# Patient Record
Sex: MALE | Race: WHITE | NOT HISPANIC OR LATINO | Employment: OTHER | URBAN - METROPOLITAN AREA
[De-identification: names, ages, dates, MRNs, and addresses within clinical notes are randomized per-mention and may not be internally consistent; named-entity substitution may affect disease eponyms.]

---

## 2023-04-01 DIAGNOSIS — E78.2 MIXED HYPERLIPIDEMIA: ICD-10-CM

## 2023-04-03 PROBLEM — R35.0 INCREASED URINARY FREQUENCY: Status: ACTIVE | Noted: 2023-04-03

## 2023-04-03 PROBLEM — F41.1 GENERALIZED ANXIETY DISORDER: Status: ACTIVE | Noted: 2023-04-03

## 2023-04-03 PROBLEM — N40.0 BPH (BENIGN PROSTATIC HYPERPLASIA): Status: ACTIVE | Noted: 2023-04-03

## 2023-04-03 PROBLEM — H61.21 IMPACTED CERUMEN OF RIGHT EAR: Status: ACTIVE | Noted: 2023-04-03

## 2023-04-03 PROBLEM — R35.1 NOCTURIA: Status: ACTIVE | Noted: 2023-04-03

## 2023-04-03 PROBLEM — E78.2 HYPERCHOLESTEROLEMIA WITH HYPERTRIGLYCERIDEMIA: Status: ACTIVE | Noted: 2023-04-03

## 2023-04-03 PROBLEM — H26.9 CATARACT, ACQUIRED: Status: ACTIVE | Noted: 2023-04-03

## 2023-04-03 PROBLEM — R97.20 ELEVATED PSA: Status: ACTIVE | Noted: 2023-04-03

## 2023-04-03 RX ORDER — BUSPIRONE HYDROCHLORIDE 10 MG/1
10 TABLET ORAL 2 TIMES DAILY
COMMUNITY

## 2023-04-03 RX ORDER — CITALOPRAM 40 MG/1
40 TABLET, FILM COATED ORAL DAILY
COMMUNITY

## 2023-04-03 RX ORDER — HYDROXYZINE HYDROCHLORIDE 25 MG/1
TABLET, FILM COATED ORAL
COMMUNITY

## 2023-04-03 RX ORDER — OMEGA-3-ACID ETHYL ESTERS 1 G/1
2 CAPSULE, LIQUID FILLED ORAL DAILY
COMMUNITY
Start: 2020-11-18 | End: 2023-11-07 | Stop reason: WASHOUT

## 2023-04-03 RX ORDER — OXYBUTYNIN CHLORIDE 5 MG/1
5 TABLET ORAL 2 TIMES DAILY
COMMUNITY
End: 2023-10-24

## 2023-04-03 RX ORDER — ONDANSETRON HYDROCHLORIDE 8 MG/1
8 TABLET, FILM COATED ORAL EVERY 8 HOURS PRN
COMMUNITY
Start: 2022-11-22 | End: 2023-11-07 | Stop reason: ALTCHOICE

## 2023-04-03 RX ORDER — PREDNISOLONE SODIUM PHOSPHATE 10 MG/ML
SOLUTION/ DROPS OPHTHALMIC
COMMUNITY
Start: 2022-12-27 | End: 2023-11-07 | Stop reason: ALTCHOICE

## 2023-04-03 RX ORDER — ATORVASTATIN CALCIUM 20 MG/1
TABLET, FILM COATED ORAL
Qty: 90 TABLET | Refills: 3 | Status: SHIPPED | OUTPATIENT
Start: 2023-04-03 | End: 2024-04-18

## 2023-04-03 RX ORDER — ATORVASTATIN CALCIUM 20 MG/1
20 TABLET, FILM COATED ORAL DAILY
COMMUNITY
End: 2023-04-03 | Stop reason: SDUPTHER

## 2023-04-18 ENCOUNTER — TELEPHONE (OUTPATIENT)
Dept: PRIMARY CARE | Facility: CLINIC | Age: 63
End: 2023-04-18
Payer: COMMERCIAL

## 2023-04-18 NOTE — TELEPHONE ENCOUNTER
Our electronic records do not go back that far but my guess is 2007 or 2008 based on when he was diagnosed with cancer. His cancer doctor may know the exact date. Can you let him know?   Thanks,  Denise Myers, DO

## 2023-04-18 NOTE — TELEPHONE ENCOUNTER
Pt wants to know when the last round of radiation he took was? I tried to look, and saw he was diagnosed in 2007, but I can't seem to find a date for his late radiation.

## 2023-05-12 ENCOUNTER — TELEPHONE (OUTPATIENT)
Dept: PRIMARY CARE | Facility: CLINIC | Age: 63
End: 2023-05-12
Payer: COMMERCIAL

## 2023-05-12 NOTE — TELEPHONE ENCOUNTER
Patient called asking if there is any way that Dr Myers can give him an excuse from jury duty due to his frequent urination issue.

## 2023-05-13 NOTE — TELEPHONE ENCOUNTER
Chart reviewed. Per last visit with urologist (Dr. Brody), his symptoms are controlled on the medication he is being prescribed. It would be dishonest/unethical to write a letter to excuse him from jury duty for this problem.   Thanks,  Denise Myers, DO

## 2023-05-15 NOTE — TELEPHONE ENCOUNTER
Pt called back and notified of providers message, expressed verbal understanding no questions at this time.

## 2023-05-16 ENCOUNTER — TELEPHONE (OUTPATIENT)
Dept: PRIMARY CARE | Facility: CLINIC | Age: 63
End: 2023-05-16
Payer: COMMERCIAL

## 2023-05-16 DIAGNOSIS — I10 BENIGN ESSENTIAL HYPERTENSION: Primary | ICD-10-CM

## 2023-05-16 RX ORDER — LOSARTAN POTASSIUM 50 MG/1
TABLET ORAL
Qty: 30 TABLET | Refills: 2 | Status: SHIPPED | OUTPATIENT
Start: 2023-05-16 | End: 2023-06-06 | Stop reason: SDUPTHER

## 2023-05-16 NOTE — TELEPHONE ENCOUNTER
I sent in a BP medication losartan for him to start taking daily. It can take up to 2 weeks to take full effect. Please schedule for MA visit for 3 weeks from today for BP check.   Thanks,  Denise Myers, DO

## 2023-05-16 NOTE — TELEPHONE ENCOUNTER
Called patient, notified of message, patient expressed verbal understanding had no questions at this time. Scheduled a visit on 6/6/23.

## 2023-05-16 NOTE — TELEPHONE ENCOUNTER
Patient is complaining of extremly high blood presuree. Patient says is systolic had been as high as 201.  He is asking for an office visit, but I am showing nothing open soon.  He is on meds to lower cholesterol, but nothing for BP.  It has been running high since April.

## 2023-05-24 PROBLEM — K21.9 GERD (GASTROESOPHAGEAL REFLUX DISEASE): Status: ACTIVE | Noted: 2023-05-24

## 2023-05-24 PROBLEM — K63.5 COLON POLYPS: Status: ACTIVE | Noted: 2023-05-24

## 2023-05-24 PROBLEM — N31.8 FREQUENCY-URGENCY SYNDROME: Status: ACTIVE | Noted: 2023-05-24

## 2023-05-24 PROBLEM — R11.0 NAUSEA IN ADULT: Status: ACTIVE | Noted: 2023-05-24

## 2023-05-24 RX ORDER — KETOROLAC TROMETHAMINE 5 MG/ML
2 SOLUTION OPHTHALMIC 4 TIMES DAILY
COMMUNITY
Start: 2023-04-12 | End: 2023-11-07 | Stop reason: ALTCHOICE

## 2023-06-01 ENCOUNTER — TELEPHONE (OUTPATIENT)
Dept: PRIMARY CARE | Facility: CLINIC | Age: 63
End: 2023-06-01
Payer: COMMERCIAL

## 2023-06-06 ENCOUNTER — CLINICAL SUPPORT (OUTPATIENT)
Dept: PRIMARY CARE | Facility: CLINIC | Age: 63
End: 2023-06-06
Payer: COMMERCIAL

## 2023-06-06 VITALS
OXYGEN SATURATION: 97 % | SYSTOLIC BLOOD PRESSURE: 162 MMHG | RESPIRATION RATE: 16 BRPM | HEART RATE: 66 BPM | DIASTOLIC BLOOD PRESSURE: 90 MMHG

## 2023-06-06 DIAGNOSIS — I10 BENIGN ESSENTIAL HYPERTENSION: ICD-10-CM

## 2023-06-06 RX ORDER — LOSARTAN POTASSIUM 100 MG/1
100 TABLET ORAL DAILY
Qty: 30 TABLET | Refills: 2 | Status: SHIPPED | OUTPATIENT
Start: 2023-06-06 | End: 2023-08-31

## 2023-06-06 NOTE — Clinical Note
I would like to increase the losartan to 100 mg. New rx sent to pharmacy. Can you let him know? I recommend MA visit in 2 weeks to re-check BP.  Thanks, Denise Myers, DO

## 2023-06-06 NOTE — PROGRESS NOTES
Spoke to patient he will call back to schedule a nurse visit for BP check for 2 weeks and start the 100mg

## 2023-06-20 ENCOUNTER — CLINICAL SUPPORT (OUTPATIENT)
Dept: PRIMARY CARE | Facility: CLINIC | Age: 63
End: 2023-06-20
Payer: COMMERCIAL

## 2023-06-20 VITALS — DIASTOLIC BLOOD PRESSURE: 75 MMHG | HEART RATE: 60 BPM | SYSTOLIC BLOOD PRESSURE: 150 MMHG | OXYGEN SATURATION: 95 %

## 2023-06-20 DIAGNOSIS — I10 BENIGN ESSENTIAL HYPERTENSION: Primary | ICD-10-CM

## 2023-06-20 PROCEDURE — 99211 OFF/OP EST MAY X REQ PHY/QHP: CPT | Performed by: FAMILY MEDICINE

## 2023-06-20 NOTE — Clinical Note
His blood pressure is still higher than I would like. Would he be ok with me adding another BP medication since he is on the maximum dose of the losartan? Thanks, Denise Myers, DO

## 2023-08-27 DIAGNOSIS — I10 BENIGN ESSENTIAL HYPERTENSION: ICD-10-CM

## 2023-08-31 RX ORDER — LOSARTAN POTASSIUM 100 MG/1
100 TABLET ORAL DAILY
Qty: 90 TABLET | Refills: 0 | Status: SHIPPED | OUTPATIENT
Start: 2023-08-31 | End: 2023-11-27

## 2023-08-31 RX ORDER — HYDROXYZINE PAMOATE 25 MG/1
25 CAPSULE ORAL EVERY 24 HOURS
COMMUNITY
End: 2023-11-07 | Stop reason: ALTCHOICE

## 2023-10-24 DIAGNOSIS — N40.0 BENIGN PROSTATIC HYPERPLASIA WITHOUT LOWER URINARY TRACT SYMPTOMS: ICD-10-CM

## 2023-10-24 RX ORDER — OXYBUTYNIN CHLORIDE 5 MG/1
5 TABLET ORAL 2 TIMES DAILY
Qty: 180 TABLET | Refills: 3 | Status: SHIPPED | OUTPATIENT
Start: 2023-10-24

## 2023-11-07 ENCOUNTER — OFFICE VISIT (OUTPATIENT)
Dept: PRIMARY CARE | Facility: CLINIC | Age: 63
End: 2023-11-07
Payer: COMMERCIAL

## 2023-11-07 VITALS
OXYGEN SATURATION: 96 % | WEIGHT: 158 LBS | HEART RATE: 70 BPM | HEIGHT: 66 IN | BODY MASS INDEX: 25.39 KG/M2 | SYSTOLIC BLOOD PRESSURE: 128 MMHG | TEMPERATURE: 97.5 F | DIASTOLIC BLOOD PRESSURE: 82 MMHG | RESPIRATION RATE: 16 BRPM

## 2023-11-07 DIAGNOSIS — R97.20 ELEVATED PSA: ICD-10-CM

## 2023-11-07 DIAGNOSIS — H61.21 IMPACTED CERUMEN OF RIGHT EAR: ICD-10-CM

## 2023-11-07 DIAGNOSIS — I10 BENIGN ESSENTIAL HYPERTENSION: ICD-10-CM

## 2023-11-07 DIAGNOSIS — R73.03 PREDIABETES: ICD-10-CM

## 2023-11-07 DIAGNOSIS — Z00.00 ANNUAL PHYSICAL EXAM: Primary | ICD-10-CM

## 2023-11-07 DIAGNOSIS — Z11.59 NEED FOR HEPATITIS C SCREENING TEST: ICD-10-CM

## 2023-11-07 DIAGNOSIS — E78.2 HYPERCHOLESTEROLEMIA WITH HYPERTRIGLYCERIDEMIA: ICD-10-CM

## 2023-11-07 PROBLEM — R35.1 NOCTURIA: Status: RESOLVED | Noted: 2023-04-03 | Resolved: 2023-11-07

## 2023-11-07 PROBLEM — R35.1 BENIGN PROSTATIC HYPERPLASIA WITH NOCTURIA: Status: ACTIVE | Noted: 2023-04-03

## 2023-11-07 PROBLEM — N40.1 BENIGN PROSTATIC HYPERPLASIA WITH NOCTURIA: Status: ACTIVE | Noted: 2023-04-03

## 2023-11-07 PROBLEM — R35.0 INCREASED URINARY FREQUENCY: Status: RESOLVED | Noted: 2023-04-03 | Resolved: 2023-11-07

## 2023-11-07 PROCEDURE — 3074F SYST BP LT 130 MM HG: CPT | Performed by: FAMILY MEDICINE

## 2023-11-07 PROCEDURE — 1036F TOBACCO NON-USER: CPT | Performed by: FAMILY MEDICINE

## 2023-11-07 PROCEDURE — 99396 PREV VISIT EST AGE 40-64: CPT | Performed by: FAMILY MEDICINE

## 2023-11-07 PROCEDURE — 3079F DIAST BP 80-89 MM HG: CPT | Performed by: FAMILY MEDICINE

## 2023-11-07 PROCEDURE — 69210 REMOVE IMPACTED EAR WAX UNI: CPT | Performed by: FAMILY MEDICINE

## 2023-11-07 SDOH — ECONOMIC STABILITY: FOOD INSECURITY: WITHIN THE PAST 12 MONTHS, THE FOOD YOU BOUGHT JUST DIDN'T LAST AND YOU DIDN'T HAVE MONEY TO GET MORE.: NEVER TRUE

## 2023-11-07 SDOH — ECONOMIC STABILITY: FOOD INSECURITY: WITHIN THE PAST 12 MONTHS, YOU WORRIED THAT YOUR FOOD WOULD RUN OUT BEFORE YOU GOT MONEY TO BUY MORE.: NEVER TRUE

## 2023-11-07 ASSESSMENT — LIFESTYLE VARIABLES
HOW OFTEN DO YOU HAVE SIX OR MORE DRINKS ON ONE OCCASION: NEVER
SKIP TO QUESTIONS 9-10: 1
HOW OFTEN DO YOU HAVE A DRINK CONTAINING ALCOHOL: NEVER
HOW MANY STANDARD DRINKS CONTAINING ALCOHOL DO YOU HAVE ON A TYPICAL DAY: PATIENT DOES NOT DRINK
AUDIT-C TOTAL SCORE: 0

## 2023-11-07 ASSESSMENT — PATIENT HEALTH QUESTIONNAIRE - PHQ9
1. LITTLE INTEREST OR PLEASURE IN DOING THINGS: NOT AT ALL
SUM OF ALL RESPONSES TO PHQ9 QUESTIONS 1 & 2: 0
2. FEELING DOWN, DEPRESSED OR HOPELESS: NOT AT ALL

## 2023-11-07 ASSESSMENT — PAIN SCALES - GENERAL: PAINLEVEL: 0-NO PAIN

## 2023-11-07 NOTE — PROGRESS NOTES
"Subjective   Patient ID: Demond Gregorio is a 62 y.o. male who presents for Annual Exam (Annual physical.) and Eye Problem (\"Floaters\" and blurriness X 1 month.).  Plans on seeing ophtho for new floaters in left eye. He had cataract surgery about 1 year ago.        In addition to that documented in the HPI above, the additional ROS was obtained:  Constitutional: Denies fevers or chills  Eyes: Denies vision changes  ENMT: Denies trouble swallowing  Cardiovascular: Denies chest pain or heart racing  Respiratory: Denies SOB or cough      Current Outpatient Medications   Medication Sig Dispense Refill    atorvastatin (Lipitor) 20 mg tablet TAKE 1 TABLET BY MOUTH EVERY DAY 90 tablet 3    busPIRone (Buspar) 10 mg tablet Take 1 tablet (10 mg) by mouth 2 times a day.      citalopram (CeleXA) 40 mg tablet Take 1 tablet (40 mg) by mouth once daily.      hydrOXYzine HCL (Atarax) 25 mg tablet TAKE 1-2 TABLET BY MOUTH TWICE A DAY AS NEEDED FOR ANXIETY/SLEEP      losartan (Cozaar) 100 mg tablet Take 1 tablet (100 mg) by mouth once daily. 90 tablet 0    oxybutynin (Ditropan) 5 mg tablet TAKE 1 TABLET BY MOUTH TWICE A  tablet 3     No current facility-administered medications for this visit.       Objective     Visit Vitals  /82 (BP Location: Right arm, Patient Position: Sitting, BP Cuff Size: Adult)   Pulse 70   Temp 36.4 °C (97.5 °F)   Resp 16   Ht 1.664 m (5' 5.5\")   Wt 71.7 kg (158 lb)   SpO2 96%   BMI 25.89 kg/m²   Smoking Status Former   BSA 1.82 m²        Constitutional: Well nourished, well developed, appears stated age  Eyes: no scleral icterus, no conjunctival injection  Ears: significant cerumen in bilateral auditory canals   Neck: no thyromegaly  Cardiovascular: regular rate and rhythm, no leg edema  Respiratory: normal respiratory effort, clear to auscultation bilaterally  Musculoskeletal: No gross deformities appreciated  Skin: Warm, dry. No rashes  Neurologic: Alert, CNs II-XII grossly intact..  Psych: " Appropriate mood and affect.      Ear Cerumen Removal    Date/Time: 11/7/2023 2:28 PM    Performed by: Denise Myers DO  Authorized by: Denise Myers DO    Consent:     Consent obtained:  Verbal    Consent given by:  Patient    Risks, benefits, and alternatives were discussed: yes      Alternatives discussed:  No treatment  Procedure details:     Location:  R ear    Procedure type: curette      Procedure outcomes: cerumen removed    Post-procedure details:     Inspection:  No bleeding    Hearing quality:  Improved    Procedure completion:  Tolerated well, no immediate complications      Assessment/Plan   Problem List Items Addressed This Visit       Elevated PSA    Relevant Orders    PSA, total and free    Hypercholesterolemia with hypertriglyceridemia    Relevant Orders    Lipid Panel    Benign essential hypertension    Relevant Orders    Comprehensive Metabolic Panel    Prediabetes    Relevant Orders    Hemoglobin A1C     Other Visit Diagnoses       Annual physical exam    -  Primary    immunizations up to date  Yearly labs ordered    Need for hepatitis C screening test        Relevant Orders    Hepatitis C Antibody    Impacted cerumen of right ear                Follow up yearly and prn.    Denise Myers DO

## 2023-11-07 NOTE — PATIENT INSTRUCTIONS
Please fast for 8 hours for the blood work. Water and black coffee is fine. You do not need an appointment to have your labs done.     Thank you for choosing [facility] for your healthcare.   As always if you have any questions or concerns please do not hesitate to call our office at [phone] or through [employer].    Have a great day!  Denise Myers, DO

## 2023-11-30 ENCOUNTER — OFFICE VISIT (OUTPATIENT)
Dept: PRIMARY CARE | Facility: CLINIC | Age: 63
End: 2023-11-30
Payer: COMMERCIAL

## 2023-11-30 VITALS
WEIGHT: 164 LBS | BODY MASS INDEX: 26.88 KG/M2 | DIASTOLIC BLOOD PRESSURE: 64 MMHG | HEART RATE: 66 BPM | SYSTOLIC BLOOD PRESSURE: 102 MMHG | OXYGEN SATURATION: 98 % | TEMPERATURE: 96.8 F

## 2023-11-30 DIAGNOSIS — D17.23 LIPOMA OF RIGHT LOWER EXTREMITY: Primary | ICD-10-CM

## 2023-11-30 PROCEDURE — 1036F TOBACCO NON-USER: CPT

## 2023-11-30 PROCEDURE — 99212 OFFICE O/P EST SF 10 MIN: CPT

## 2023-11-30 PROCEDURE — 3078F DIAST BP <80 MM HG: CPT

## 2023-11-30 PROCEDURE — 3074F SYST BP LT 130 MM HG: CPT

## 2023-11-30 ASSESSMENT — ENCOUNTER SYMPTOMS
RESPIRATORY NEGATIVE: 1
HEMATOLOGIC/LYMPHATIC NEGATIVE: 1
ENDOCRINE NEGATIVE: 1
CARDIOVASCULAR NEGATIVE: 1
EYES NEGATIVE: 1
PSYCHIATRIC NEGATIVE: 1
NEUROLOGICAL NEGATIVE: 1
MUSCULOSKELETAL NEGATIVE: 1
GASTROINTESTINAL NEGATIVE: 1
CONSTITUTIONAL NEGATIVE: 1

## 2023-11-30 NOTE — PROGRESS NOTES
Subjective   Patient ID: Demond Gregorio is a 62 y.o. male who presents for evaluation of knot/lump.    Noticed a lump/knot 2 months ago while showering, unsure of how long it's been there.    Review of Systems   Constitutional: Negative.    HENT: Negative.     Eyes: Negative.    Respiratory: Negative.     Cardiovascular: Negative.    Gastrointestinal: Negative.    Endocrine: Negative.    Genitourinary: Negative.    Musculoskeletal: Negative.    Skin: Negative.    Neurological: Negative.    Hematological: Negative.    Psychiatric/Behavioral: Negative.          Current Outpatient Medications   Medication Sig Dispense Refill    atorvastatin (Lipitor) 20 mg tablet TAKE 1 TABLET BY MOUTH EVERY DAY 90 tablet 3    busPIRone (Buspar) 10 mg tablet Take 1 tablet (10 mg) by mouth 2 times a day.      citalopram (CeleXA) 40 mg tablet Take 1 tablet (40 mg) by mouth once daily.      hydrOXYzine HCL (Atarax) 25 mg tablet TAKE 1-2 TABLET BY MOUTH TWICE A DAY AS NEEDED FOR ANXIETY/SLEEP      losartan (Cozaar) 100 mg tablet Take 1 tablet (100 mg) by mouth once daily. 90 tablet 3    oxybutynin (Ditropan) 5 mg tablet TAKE 1 TABLET BY MOUTH TWICE A  tablet 3     No current facility-administered medications for this visit.     Past Surgical History:   Procedure Laterality Date    HAND TENDON SURGERY  10/10/2017    Hand Incision Tendon Sheath Of A Finger    HERNIA REPAIR  10/10/2017    Hernia Repair    OTHER SURGICAL HISTORY  10/10/2017    Chemoradiation Therapy     Family History   Problem Relation Name Age of Onset    Other (cardiac disorder) Father      Other (cardiac disorder) Other      Diabetes Other      Other (htn) Other      Irritable bowel syndrome Other      Kidney disease Other      Other (neoplasma) Other        Social History     Tobacco Use    Smoking status: Former     Types: Cigarettes    Smokeless tobacco: Never   Vaping Use    Vaping Use: Never used   Substance Use Topics    Alcohol use: Not Currently    Drug use:  Never        Objective     Visit Vitals  /64 (BP Location: Left arm, Patient Position: Sitting, BP Cuff Size: Small adult)   Pulse 66   Temp 36 °C (96.8 °F)   Wt 74.4 kg (164 lb)   SpO2 98%   BMI 26.88 kg/m²   Smoking Status Former   BSA 1.85 m²        Physical Exam  Skin:            Comments: Dime sized soft non-fluctuant, non-mobile mass. Non painful to palpation           Assessment/Plan   Problem List Items Addressed This Visit    None  Visit Diagnoses       Lipoma of right lower extremity    -  Primary            All pertinent lab work and results were reviewed with patient.     Follow up with Dr. Myers as previously scheduled    Brandy Ricci, MARIAELENA-CNS

## 2024-02-21 ENCOUNTER — LAB (OUTPATIENT)
Dept: LAB | Facility: LAB | Age: 64
End: 2024-02-21
Payer: MEDICARE

## 2024-02-21 DIAGNOSIS — E78.2 HYPERCHOLESTEROLEMIA WITH HYPERTRIGLYCERIDEMIA: ICD-10-CM

## 2024-02-21 DIAGNOSIS — R97.20 ELEVATED PSA: ICD-10-CM

## 2024-02-21 DIAGNOSIS — R73.03 PREDIABETES: ICD-10-CM

## 2024-02-21 DIAGNOSIS — Z11.59 NEED FOR HEPATITIS C SCREENING TEST: ICD-10-CM

## 2024-02-21 DIAGNOSIS — I10 BENIGN ESSENTIAL HYPERTENSION: ICD-10-CM

## 2024-02-21 LAB
ALBUMIN SERPL BCP-MCNC: 4.2 G/DL (ref 3.4–5)
ALP SERPL-CCNC: 44 U/L (ref 33–136)
ALT SERPL W P-5'-P-CCNC: 17 U/L (ref 10–52)
ANION GAP SERPL CALC-SCNC: 8 MMOL/L (ref 10–20)
AST SERPL W P-5'-P-CCNC: 18 U/L (ref 9–39)
BILIRUB SERPL-MCNC: 0.6 MG/DL (ref 0–1.2)
BUN SERPL-MCNC: 17 MG/DL (ref 6–23)
CALCIUM SERPL-MCNC: 9.2 MG/DL (ref 8.6–10.3)
CHLORIDE SERPL-SCNC: 105 MMOL/L (ref 98–107)
CHOLEST SERPL-MCNC: 171 MG/DL (ref 0–199)
CHOLESTEROL/HDL RATIO: 4.5
CO2 SERPL-SCNC: 27 MMOL/L (ref 21–32)
CREAT SERPL-MCNC: 0.88 MG/DL (ref 0.5–1.3)
EGFRCR SERPLBLD CKD-EPI 2021: >90 ML/MIN/1.73M*2
GLUCOSE SERPL-MCNC: 87 MG/DL (ref 74–99)
HCV AB SER QL: NONREACTIVE
HDLC SERPL-MCNC: 37.6 MG/DL
LDLC SERPL CALC-MCNC: 99 MG/DL
NON HDL CHOLESTEROL: 133 MG/DL (ref 0–149)
POTASSIUM SERPL-SCNC: 4.5 MMOL/L (ref 3.5–5.3)
PROT SERPL-MCNC: 6.6 G/DL (ref 6.4–8.2)
SODIUM SERPL-SCNC: 135 MMOL/L (ref 136–145)
TRIGL SERPL-MCNC: 171 MG/DL (ref 0–149)
VLDL: 34 MG/DL (ref 0–40)

## 2024-02-21 PROCEDURE — 80053 COMPREHEN METABOLIC PANEL: CPT

## 2024-02-21 PROCEDURE — 86803 HEPATITIS C AB TEST: CPT

## 2024-02-21 PROCEDURE — 80061 LIPID PANEL: CPT

## 2024-02-21 PROCEDURE — 84153 ASSAY OF PSA TOTAL: CPT

## 2024-02-21 PROCEDURE — 36415 COLL VENOUS BLD VENIPUNCTURE: CPT

## 2024-02-21 PROCEDURE — 84154 ASSAY OF PSA FREE: CPT

## 2024-02-21 PROCEDURE — 83036 HEMOGLOBIN GLYCOSYLATED A1C: CPT

## 2024-02-22 LAB
EST. AVERAGE GLUCOSE BLD GHB EST-MCNC: 128 MG/DL
HBA1C MFR BLD: 6.1 %

## 2024-02-23 LAB
PSA FREE MFR SERPL: 16 %
PSA FREE SERPL-MCNC: 1.9 NG/ML
PSA SERPL IA-MCNC: 11.8 NG/ML (ref 0–4)

## 2024-04-17 ENCOUNTER — TELEPHONE (OUTPATIENT)
Dept: PRIMARY CARE | Facility: CLINIC | Age: 64
End: 2024-04-17
Payer: COMMERCIAL

## 2024-04-17 DIAGNOSIS — E78.2 MIXED HYPERLIPIDEMIA: ICD-10-CM

## 2024-04-17 DIAGNOSIS — I10 BENIGN ESSENTIAL HYPERTENSION: Primary | ICD-10-CM

## 2024-04-17 RX ORDER — AMLODIPINE BESYLATE 5 MG/1
5 TABLET ORAL DAILY
Qty: 30 TABLET | Refills: 2 | Status: SHIPPED | OUTPATIENT
Start: 2024-04-17 | End: 2024-05-02 | Stop reason: SDUPTHER

## 2024-04-17 NOTE — TELEPHONE ENCOUNTER
Pt called to say that he got a BP reading of 190/82 on Monday and 170/92 today. Pt is concerned and is wondering if he should come in for a nurse visit or if you want to change the dosage on his losartan?    [facility] in [place].

## 2024-04-17 NOTE — TELEPHONE ENCOUNTER
He is on the max dose of losartan. I recommend adding second BP medication called amlodipine to bring his BP down. I went ahead and sent in new rx to pharmacy. I would like him to check BP daily x 1 week after starting the medication with his readings. Thanks,  Denise Myers, DO

## 2024-04-18 RX ORDER — ATORVASTATIN CALCIUM 20 MG/1
TABLET, FILM COATED ORAL
Qty: 90 TABLET | Refills: 3 | Status: SHIPPED | OUTPATIENT
Start: 2024-04-18 | End: 2025-04-18

## 2024-04-18 NOTE — TELEPHONE ENCOUNTER
Pt called back and said he picked up the medication and started taking it.  He understood Dr Myers's message and has no questions at this time.

## 2024-04-18 NOTE — TELEPHONE ENCOUNTER
LEFT MESSAGE ON MACHINE for patient to call office, when call is returned please give patient provider's message.

## 2024-05-02 ENCOUNTER — TELEPHONE (OUTPATIENT)
Dept: PRIMARY CARE | Facility: CLINIC | Age: 64
End: 2024-05-02
Payer: COMMERCIAL

## 2024-05-02 DIAGNOSIS — I10 BENIGN ESSENTIAL HYPERTENSION: ICD-10-CM

## 2024-05-02 RX ORDER — AMLODIPINE BESYLATE 10 MG/1
10 TABLET ORAL DAILY
Qty: 30 TABLET | Refills: 2 | Status: SHIPPED | OUTPATIENT
Start: 2024-05-02 | End: 2024-05-24

## 2024-05-02 NOTE — TELEPHONE ENCOUNTER
Pt stopped by the office and dropped off his blood pressure readings.  They are:    04/15/24  -  180/90  04/18/24  -  took 5mg Amlodipine  and waiting for B/P machine  04/20/24  -  163/80 - 22:00  04/21/24  -  148/72 - 13:00             150/85 - 22:45  04/22/24  -  150/76  - 22:00  04/23/24  -  147/73  - 22:30  04/24/24  -  118/64  - 13:15                       165/86 - 22:00  04/25/24  -  146/78  - 22:00  04/26/24  -  127/71  - 22:30  04/27/24  -  142/79  - 22:30  04/28/24  -  128/76  - 22:00   No

## 2024-05-02 NOTE — TELEPHONE ENCOUNTER
I would like him to increase the amlodipine to 10 mg to see if his readings come down a little more on the higher dose. New rx for 10 mg sent to pharmacy. Thanks,  Denise Myers, DO

## 2024-05-13 ENCOUNTER — OFFICE VISIT (OUTPATIENT)
Dept: UROLOGY | Facility: CLINIC | Age: 64
End: 2024-05-13
Payer: COMMERCIAL

## 2024-05-13 VITALS — SYSTOLIC BLOOD PRESSURE: 124 MMHG | DIASTOLIC BLOOD PRESSURE: 68 MMHG

## 2024-05-13 DIAGNOSIS — N40.1 BENIGN PROSTATIC HYPERPLASIA WITH URINARY FREQUENCY: ICD-10-CM

## 2024-05-13 DIAGNOSIS — R35.0 BENIGN PROSTATIC HYPERPLASIA WITH URINARY FREQUENCY: ICD-10-CM

## 2024-05-13 PROCEDURE — 3074F SYST BP LT 130 MM HG: CPT | Performed by: UROLOGY

## 2024-05-13 PROCEDURE — 1157F ADVNC CARE PLAN IN RCRD: CPT | Performed by: UROLOGY

## 2024-05-13 PROCEDURE — 1159F MED LIST DOCD IN RCRD: CPT | Performed by: UROLOGY

## 2024-05-13 PROCEDURE — 99213 OFFICE O/P EST LOW 20 MIN: CPT | Performed by: UROLOGY

## 2024-05-13 PROCEDURE — 3078F DIAST BP <80 MM HG: CPT | Performed by: UROLOGY

## 2024-05-13 RX ORDER — CLINDAMYCIN HYDROCHLORIDE 300 MG/1
300 CAPSULE ORAL 4 TIMES DAILY
COMMUNITY
Start: 2024-05-07 | End: 2024-05-17

## 2024-05-13 NOTE — PROGRESS NOTES
05/13/2024  Voiding well on oxybutynin 5 mg twice a day    PVR up to 332 ml    LAURA: Deferred    PSA normal    We discussed benign prostate hypertrophy/unstable bladder, incomplete emptying  We discussed reduced oxybutynin to 1 a day  We discussed PSA screening elevated PSA, continue monitoring  All the questions were answered, the patient expressed understanding and agreed to the plan.    Impression  BPH  Nocturia  Elevated PSA stable     Plan  Change oxybutynin to 5 mg daily  Yearly LAURA and PSA      Chief Complaint   Patient presents with    Benign Prostatic Hypertrophy     Patient here for yearly check. He is currently prescribed Oxybutynin 5 mg daily. Urinary frequency 1x every 1-2 hours. Nocturia 2-3x.     Elevated PSA        Physical Exam     TODAYS LAB RESULTS:    PVR  332 mL    PSA 02/21/2024  11.8    No urine sample.     ASSESSMENT&PLAN:      IMPRESSIONS:  05/15/2023  Voiding frequently, oxybutynin 5 mg daily     Patient has no nausea, no vomiting, no fever.     LAURA: 3+, no nodule     PSA: Elevated but stable     Patient here today for follow up elevated PSA/BPH.     Taking Oxybutynin 5 mg daily. He needs a letter for jury Duty.     PSA 11/24/22 10.82.     Nocturia -3-4 x  Frequency -every 30min-1 hr   Urinary urgency -yes  Weak Stream -variable  Straining to void -sometimes  Dysuria -no  Incontinence -no     PMI=[id number] pt tried to void more with no luck.     We discussed benign prostate hypertrophy with moderate voiding symptoms frequency urgency, continue oxybutynin daily  We discussed elevated PSA, but stable  All the questions were answered, the patient expressed understanding and agreed to the plan.     Impression  BPH  Nocturia  Elevated PSAâ€“stable     Plan  Letter to excuse jury duty due to moderate voiding symptom  Continue oxybutynin 5 mg daily  Yearly LAURA and PSA        11/15/21:   Patient is being seen via Telephone appt, for one year follow up, BPH     Patient is taking 5mg Oxybutynin daily.       Patient has no nausea, vomiting, or fever      PSA 11/09/21 10.12 -- stable      We discussed benign prostate hypertrophy, mild symptoms stable on Oxybutynin  We discussed elevated PSA, negative biopsy in the past, stable PSA  All the questions were answered, the patient expressed understanding and agreed to the plan.     Impression  BPH  Nocturia  Elevated PSAâ€“stable     Plan  Continue oxybutynin 5 mg twice a day  Yearly LAURA and PSA           11/13/2020  Telephone interview     Voiding well on oxybutynin 5 mg daily, nocturia Ã--2, does not bother     Patient has no nausea, no vomiting, no fever.     PSA 11/6/20 10.73. Stable     We discussed benign prostate hypertrophy, symptoms stable on oxybutynin  We discussed elevated PSA, negative biopsy in the past and stable  All the questions were answered, the patient expressed understanding and agreed to the plan.     Impression  BPH  Nocturia  Elevated PSAâ€“stable     Plan  Continue oxybutynin 5 mg twice a day  Yearly LAURA and PSA           11/08/2019  Voiding well, on oxybutynin     Patient has no nausea, no vomiting, no fever.     Exam: Normal genitalia     LAURA: 3+, no nodule     We discussed the benign prostate hypertrophy, mild voiding symptoms are oxybutynin  Elevated PSA but stable, negative biopsy Ã--2  All the questions were answered, the patient expressed understanding and agreed to the plan.     Impression  BPH  Elevated PSAâ€“stable     Plan  Continue oxybutynin 5 mg twice a day  Yearly LAURA and PSA           11/19 H BM   57-year-old male 2 weeks status post transrectal ultrasound and biopsy of the prostate for an elevated PSA the biopsy results indicate chronic inflammation and the prostate with no evidence of malignancy. A copy of the pathology report was shared with the patient he denies any difficulty voiding were recommending a PSA every 6 months and if it remains elevated but stable return to the office in 1 year for follow-up.        PSA  02/21/2024   11.8  11/24/22 10.82.  11/09/21 10.12  11/6/20 10.73.  11/5/19 9.49  7/30/19 10.88  4/30/19 9.06     Surgery  10/23/18 prostate biopsy negative

## 2024-05-24 DIAGNOSIS — I10 BENIGN ESSENTIAL HYPERTENSION: ICD-10-CM

## 2024-05-24 RX ORDER — AMLODIPINE BESYLATE 10 MG/1
10 TABLET ORAL DAILY
Qty: 90 TABLET | Refills: 3 | Status: SHIPPED | OUTPATIENT
Start: 2024-05-24 | End: 2025-05-24

## 2024-05-24 NOTE — TELEPHONE ENCOUNTER
Blood pressure readings look great. He should continue his current medications. Thanks,  Denise Myers, DO

## 2024-05-24 NOTE — TELEPHONE ENCOUNTER
Pt stopped by the office and gave the readings of his most recent blood pressure readings.  The readings are as follows:           10:30AM  05/03/24 132/73  05/04/24 127/72  05/05/24 122/74  05/06/24 134/78  05/07/24 132/67     11:30am  05/08/24 128/67     10:30am  05/09/24 134/74     11:00am  05/10/24 131/74  05/11/24 109/65     11:45pm    123/66  05/11/24     10:30pm  05/12/24 117/68     11:30pm  05/13/24 137/74  05/14/24 119/61  05/15/24 106/61  05/16/24 121/71  05/17/24 128/70     Dr Myers made aware.

## 2024-11-05 DIAGNOSIS — I10 BENIGN ESSENTIAL HYPERTENSION: ICD-10-CM

## 2024-11-05 RX ORDER — LOSARTAN POTASSIUM 100 MG/1
100 TABLET ORAL DAILY
Qty: 90 TABLET | Refills: 3 | Status: SHIPPED | OUTPATIENT
Start: 2024-11-05

## 2024-11-12 ENCOUNTER — APPOINTMENT (OUTPATIENT)
Dept: PRIMARY CARE | Facility: CLINIC | Age: 64
End: 2024-11-12
Payer: COMMERCIAL

## 2024-11-12 VITALS
HEIGHT: 66 IN | RESPIRATION RATE: 20 BRPM | DIASTOLIC BLOOD PRESSURE: 71 MMHG | HEART RATE: 74 BPM | WEIGHT: 173 LBS | BODY MASS INDEX: 27.8 KG/M2 | OXYGEN SATURATION: 97 % | SYSTOLIC BLOOD PRESSURE: 129 MMHG | TEMPERATURE: 96.8 F

## 2024-11-12 DIAGNOSIS — Z13.6 SCREENING FOR CARDIOVASCULAR CONDITION: ICD-10-CM

## 2024-11-12 DIAGNOSIS — Z00.00 ROUTINE GENERAL MEDICAL EXAMINATION AT HEALTH CARE FACILITY: Primary | ICD-10-CM

## 2024-11-12 DIAGNOSIS — Z23 NEED FOR PNEUMOCOCCAL 20-VALENT CONJUGATE VACCINATION: ICD-10-CM

## 2024-11-12 DIAGNOSIS — H61.22 LEFT EAR IMPACTED CERUMEN: ICD-10-CM

## 2024-11-12 DIAGNOSIS — Z13.6 SCREENING FOR AAA (ABDOMINAL AORTIC ANEURYSM): ICD-10-CM

## 2024-11-12 DIAGNOSIS — E78.2 HYPERCHOLESTEROLEMIA WITH HYPERTRIGLYCERIDEMIA: ICD-10-CM

## 2024-11-12 DIAGNOSIS — R73.03 PREDIABETES: ICD-10-CM

## 2024-11-12 DIAGNOSIS — Z87.891 HISTORY OF TOBACCO USE: ICD-10-CM

## 2024-11-12 DIAGNOSIS — I10 BENIGN ESSENTIAL HYPERTENSION: ICD-10-CM

## 2024-11-12 PROCEDURE — 1160F RVW MEDS BY RX/DR IN RCRD: CPT | Performed by: FAMILY MEDICINE

## 2024-11-12 PROCEDURE — 69210 REMOVE IMPACTED EAR WAX UNI: CPT | Performed by: FAMILY MEDICINE

## 2024-11-12 PROCEDURE — 3008F BODY MASS INDEX DOCD: CPT | Performed by: FAMILY MEDICINE

## 2024-11-12 PROCEDURE — 1157F ADVNC CARE PLAN IN RCRD: CPT | Performed by: FAMILY MEDICINE

## 2024-11-12 PROCEDURE — G0009 ADMIN PNEUMOCOCCAL VACCINE: HCPCS | Performed by: FAMILY MEDICINE

## 2024-11-12 PROCEDURE — 3074F SYST BP LT 130 MM HG: CPT | Performed by: FAMILY MEDICINE

## 2024-11-12 PROCEDURE — 1170F FXNL STATUS ASSESSED: CPT | Performed by: FAMILY MEDICINE

## 2024-11-12 PROCEDURE — 1159F MED LIST DOCD IN RCRD: CPT | Performed by: FAMILY MEDICINE

## 2024-11-12 PROCEDURE — G0403 EKG FOR INITIAL PREVENT EXAM: HCPCS | Performed by: FAMILY MEDICINE

## 2024-11-12 PROCEDURE — 3078F DIAST BP <80 MM HG: CPT | Performed by: FAMILY MEDICINE

## 2024-11-12 PROCEDURE — 1036F TOBACCO NON-USER: CPT | Performed by: FAMILY MEDICINE

## 2024-11-12 PROCEDURE — 1126F AMNT PAIN NOTED NONE PRSNT: CPT | Performed by: FAMILY MEDICINE

## 2024-11-12 PROCEDURE — G0402 INITIAL PREVENTIVE EXAM: HCPCS | Performed by: FAMILY MEDICINE

## 2024-11-12 PROCEDURE — 90677 PCV20 VACCINE IM: CPT | Performed by: FAMILY MEDICINE

## 2024-11-12 SDOH — ECONOMIC STABILITY: FOOD INSECURITY: WITHIN THE PAST 12 MONTHS, THE FOOD YOU BOUGHT JUST DIDN'T LAST AND YOU DIDN'T HAVE MONEY TO GET MORE.: NEVER TRUE

## 2024-11-12 SDOH — ECONOMIC STABILITY: FOOD INSECURITY: WITHIN THE PAST 12 MONTHS, YOU WORRIED THAT YOUR FOOD WOULD RUN OUT BEFORE YOU GOT MONEY TO BUY MORE.: NEVER TRUE

## 2024-11-12 ASSESSMENT — LIFESTYLE VARIABLES
HOW OFTEN DO YOU HAVE A DRINK CONTAINING ALCOHOL: NEVER
HOW OFTEN DO YOU HAVE SIX OR MORE DRINKS ON ONE OCCASION: NEVER
SKIP TO QUESTIONS 9-10: 1
AUDIT-C TOTAL SCORE: 0
HOW MANY STANDARD DRINKS CONTAINING ALCOHOL DO YOU HAVE ON A TYPICAL DAY: PATIENT DOES NOT DRINK

## 2024-11-12 ASSESSMENT — PATIENT HEALTH QUESTIONNAIRE - PHQ9
SUM OF ALL RESPONSES TO PHQ9 QUESTIONS 1 & 2: 2
2. FEELING DOWN, DEPRESSED OR HOPELESS: SEVERAL DAYS
1. LITTLE INTEREST OR PLEASURE IN DOING THINGS: SEVERAL DAYS
10. IF YOU CHECKED OFF ANY PROBLEMS, HOW DIFFICULT HAVE THESE PROBLEMS MADE IT FOR YOU TO DO YOUR WORK, TAKE CARE OF THINGS AT HOME, OR GET ALONG WITH OTHER PEOPLE: SOMEWHAT DIFFICULT

## 2024-11-12 ASSESSMENT — ENCOUNTER SYMPTOMS
LOSS OF SENSATION IN FEET: 0
DEPRESSION: 1
OCCASIONAL FEELINGS OF UNSTEADINESS: 0

## 2024-11-12 ASSESSMENT — ACTIVITIES OF DAILY LIVING (ADL)
BATHING: INDEPENDENT
GROCERY_SHOPPING: INDEPENDENT
DOING_HOUSEWORK: INDEPENDENT
DRESSING: INDEPENDENT
MANAGING_FINANCES: INDEPENDENT
TAKING_MEDICATION: INDEPENDENT

## 2024-11-12 ASSESSMENT — PAIN SCALES - GENERAL: PAINLEVEL_OUTOF10: 0-NO PAIN

## 2024-11-12 NOTE — ASSESSMENT & PLAN NOTE
Controlled on losartan and amlodipine, continue  Orders:    Comprehensive Metabolic Panel; Future    CBC; Future

## 2024-11-12 NOTE — PATIENT INSTRUCTIONS
Please fast for 8 hours for the blood work. Water and black coffee is fine. You do not need an appointment to have your labs done.  You can do the blood work in May along with the blood work from Dr. Brody.     Thank you for choosing [facility] for your healthcare.   As always if you have any questions or concerns please do not hesitate to call our office at [phone] or through [employer].    Have a great day!  Denise Myers, DO

## 2024-11-12 NOTE — ASSESSMENT & PLAN NOTE
Controlled on atorvastatin, continue  Orders:    Lipid Panel; Future    Comprehensive Metabolic Panel; Future

## 2024-11-12 NOTE — PROGRESS NOTES
"Subjective   Reason for Visit: Demond Gregorio is an 63 y.o. male here for a Medicare Wellness visit.     Past Medical, Surgical, and Family History reviewed and updated in chart.    Reviewed all medications by prescribing practitioner or clinical pharmacist (such as prescriptions, OTCs, herbal therapies and supplements) and documented in the medical record.    Concerned about possible cerumen impaction due to fullness in ears.        Patient Care Team:  Denise Myers DO as PCP - General  MARIAELENA Sanders-CNS as PCP - [facility] PCP  Mamadou Brody MD as Consulting Physician (Urology)  LILIAN Randall as Nurse Practitioner (Gastroenterology)       Objective   Vitals:  /71 (BP Location: Right arm, Patient Position: Sitting, BP Cuff Size: Adult long)   Pulse 74   Temp 36 °C (96.8 °F) (Temporal)   Resp 20   Ht 1.664 m (5' 5.5\")   Wt 78.5 kg (173 lb)   SpO2 97%   BMI 28.35 kg/m²       Physical Exam  Constitutional: Well nourished, well developed, appears stated age  Eyes: no scleral icterus, no conjunctival injection  Ears: normal external auditory canal, no retraction or bulging of tympanic membranes. Cerumen impaction left ear. See procedure note below.    Neck: no thyromegaly  Cardiovascular: regular rate and rhythm, no leg edema  Respiratory: normal respiratory effort, clear to auscultation bilaterally  Musculoskeletal: No gross deformities appreciated  Skin: Warm, dry. No rashes  Neurologic: Alert, CNs II-XII grossly intact..  Psych: Appropriate mood and affect.      Ear Cerumen Removal    Date/Time: 11/12/2024 2:43 PM    Performed by: Denise Myers DO  Authorized by: Denise Myers DO    Consent:     Consent obtained:  Verbal    Consent given by:  Patient    Risks, benefits, and alternatives were discussed: yes    Universal protocol:     Patient identity confirmed:  Verbally with patient  Procedure details:     Location:  L ear    Procedure type: curette      " Procedure outcomes: cerumen removed    Post-procedure details:     Inspection:  No bleeding and ear canal clear    Hearing quality:  Improved    Procedure completion:  Tolerated well, no immediate complications      Assessment & Plan  Routine general medical examination at health care facility  Prevnar 20 given today  AAA screening ordered today  Colonoscopy done 2019  PSA monitored by urology  Orders:    Follow Up In Primary Care - Medicare Annual; Future    Screening for cardiovascular condition    Orders:    ECG 12 Lead    Hypercholesterolemia with hypertriglyceridemia  Controlled on atorvastatin, continue  Orders:    Lipid Panel; Future    Comprehensive Metabolic Panel; Future    Benign essential hypertension  Controlled on losartan and amlodipine, continue  Orders:    Comprehensive Metabolic Panel; Future    CBC; Future    Prediabetes    Orders:    Hemoglobin A1C; Future    Need for pneumococcal 20-valent conjugate vaccination    Orders:    Pneumococcal conjugate vaccine, 20-valent (PREVNAR 20)    Screening for AAA (abdominal aortic aneurysm)    Orders:    Abdominal aorta anuerysm (AAA) screening; Future    History of tobacco use    Orders:    Abdominal aorta anuerysm (AAA) screening; Future    Left ear impacted cerumen  See procedure note          Follow up yearly and prn.  Denise Myers DO

## 2025-02-14 DIAGNOSIS — E78.2 MIXED HYPERLIPIDEMIA: ICD-10-CM

## 2025-02-14 RX ORDER — ATORVASTATIN CALCIUM 20 MG/1
TABLET, FILM COATED ORAL
Qty: 90 TABLET | Refills: 3 | Status: SHIPPED | OUTPATIENT
Start: 2025-02-14 | End: 2026-02-14

## 2025-03-25 ENCOUNTER — APPOINTMENT (OUTPATIENT)
Dept: RADIOLOGY | Facility: CLINIC | Age: 65
End: 2025-03-25
Payer: COMMERCIAL

## 2025-03-27 ENCOUNTER — HOSPITAL ENCOUNTER (OUTPATIENT)
Dept: RADIOLOGY | Facility: CLINIC | Age: 65
Discharge: HOME | End: 2025-03-27
Payer: COMMERCIAL

## 2025-03-27 DIAGNOSIS — Z87.891 HISTORY OF TOBACCO USE: ICD-10-CM

## 2025-03-27 DIAGNOSIS — Z13.6 SCREENING FOR AAA (ABDOMINAL AORTIC ANEURYSM): ICD-10-CM

## 2025-03-27 PROCEDURE — 76706 US ABDL AORTA SCREEN AAA: CPT

## 2025-03-31 ENCOUNTER — TELEPHONE (OUTPATIENT)
Dept: PRIMARY CARE | Facility: CLINIC | Age: 65
End: 2025-03-31
Payer: COMMERCIAL

## 2025-03-31 NOTE — TELEPHONE ENCOUNTER
----- Message from Denise Myers sent at 3/30/2025  6:46 PM EDT -----  Abdominal ultrasound was negative for aneurysm. Thanks,  Denise Myers, DO

## 2025-05-09 DIAGNOSIS — I10 BENIGN ESSENTIAL HYPERTENSION: ICD-10-CM

## 2025-05-09 RX ORDER — AMLODIPINE BESYLATE 10 MG/1
10 TABLET ORAL DAILY
Qty: 90 TABLET | Refills: 3 | Status: SHIPPED | OUTPATIENT
Start: 2025-05-09

## 2025-05-15 ENCOUNTER — APPOINTMENT (OUTPATIENT)
Dept: UROLOGY | Facility: CLINIC | Age: 65
End: 2025-05-15
Payer: COMMERCIAL

## 2025-05-15 DIAGNOSIS — N40.1 BPH ASSOCIATED WITH NOCTURIA: ICD-10-CM

## 2025-05-15 DIAGNOSIS — R97.20 ELEVATED PSA: Primary | ICD-10-CM

## 2025-05-15 DIAGNOSIS — R35.1 BPH ASSOCIATED WITH NOCTURIA: ICD-10-CM

## 2025-05-15 NOTE — PROGRESS NOTES
05/15/2025  Voiding well on oxybutynin 5 mg daily.  Nocturia x 2    PSA pending    We discussed benign prostate hypertrophy/unstable bladder, incomplete emptying  We discussed reduced oxybutynin to 1 a day  We discussed PSA screening elevated PSA, continue monitoring  All the questions were answered, the patient expressed understanding and agreed to the plan.     Impression  BPH  Nocturia  Elevated PSA stable     Plan  PSA now and in a year  continue oxybutynin to 5 mg daily  Appointment in a year       Chief Complaint   Patient presents with    Elevated PSA     Pt is here today for a yearly follow up. He states his voiding issues are the same.        Physical Exam     TODAYS LAB RESULTS:  Lab Results   Component Value Date    PSA 11.8 (H) 02/21/2024    PSA 10.82 (H) 11/09/2022    PSA 10.12 (H) 11/09/2021         ASSESSMENT&PLAN:      IMPRESSIONS:         05/13/2024  Voiding well on oxybutynin 5 mg twice a day     PVR up to 332 ml     LAURA: Deferred     PSA normal     We discussed benign prostate hypertrophy/unstable bladder, incomplete emptying  We discussed reduced oxybutynin to 1 a day  We discussed PSA screening elevated PSA, continue monitoring  All the questions were answered, the patient expressed understanding and agreed to the plan.     Impression  BPH  Nocturia  Elevated PSA stable     Plan  Change oxybutynin to 5 mg daily  Yearly LAURA and PSA             Chief Complaint   Patient presents with    Benign Prostatic Hypertrophy       Patient here for yearly check. He is currently prescribed Oxybutynin 5 mg daily. Urinary frequency 1x every 1-2 hours. Nocturia 2-3x.     Elevated PSA         Physical Exam      TODAYS LAB RESULTS:     PVR  332 mL     PSA 02/21/2024  11.8     No urine sample.      ASSESSMENT&PLAN:        IMPRESSIONS:  05/15/2023  Voiding frequently, oxybutynin 5 mg daily     Patient has no nausea, no vomiting, no fever.     LAURA: 3+, no nodule     PSA: Elevated but stable     Patient here today for  follow up elevated PSA/BPH.     Taking Oxybutynin 5 mg daily. He needs a letter for jury Duty.     PSA 11/24/22 10.82.     Nocturia -3-4 x  Frequency -every 30min-1 hr   Urinary urgency -yes  Weak Stream -variable  Straining to void -sometimes  Dysuria -no  Incontinence -no     EZT=360 pt tried to void more with no luck.     We discussed benign prostate hypertrophy with moderate voiding symptoms frequency urgency, continue oxybutynin daily  We discussed elevated PSA, but stable  All the questions were answered, the patient expressed understanding and agreed to the plan.     Impression  BPH  Nocturia  Elevated PSAâ€“stable     Plan  Letter to excuse jury duty due to moderate voiding symptom  Continue oxybutynin 5 mg daily  Yearly LAURA and PSA        11/15/21:   Patient is being seen via Telephone appt, for one year follow up, BPH     Patient is taking 5mg Oxybutynin daily.      Patient has no nausea, vomiting, or fever      PSA 11/09/21 10.12 -- stable      We discussed benign prostate hypertrophy, mild symptoms stable on Oxybutynin  We discussed elevated PSA, negative biopsy in the past, stable PSA  All the questions were answered, the patient expressed understanding and agreed to the plan.     Impression  BPH  Nocturia  Elevated PSAâ€“stable     Plan  Continue oxybutynin 5 mg twice a day  Yearly LAURA and PSA           11/13/2020  Telephone interview     Voiding well on oxybutynin 5 mg daily, nocturia Ã--2, does not bother     Patient has no nausea, no vomiting, no fever.     PSA 11/6/20 10.73. Stable     We discussed benign prostate hypertrophy, symptoms stable on oxybutynin  We discussed elevated PSA, negative biopsy in the past and stable  All the questions were answered, the patient expressed understanding and agreed to the plan.     Impression  BPH  Nocturia  Elevated PSAâ€“stable     Plan  Continue oxybutynin 5 mg twice a day  Yearly LAURA and PSA           11/08/2019  Voiding well, on oxybutynin     Patient has  no nausea, no vomiting, no fever.     Exam: Normal genitalia     LAURA: 3+, no nodule     We discussed the benign prostate hypertrophy, mild voiding symptoms are oxybutynin  Elevated PSA but stable, negative biopsy Ã--2  All the questions were answered, the patient expressed understanding and agreed to the plan.     Impression  BPH  Elevated PSAâ€“stable     Plan  Continue oxybutynin 5 mg twice a day  Yearly LAURA and PSA           11/19 H BM   57-year-old male 2 weeks status post transrectal ultrasound and biopsy of the prostate for an elevated PSA the biopsy results indicate chronic inflammation and the prostate with no evidence of malignancy. A copy of the pathology report was shared with the patient he denies any difficulty voiding were recommending a PSA every 6 months and if it remains elevated but stable return to the office in 1 year for follow-up.        PSA  02/21/2024  11.8  11/24/22 10.82.  11/09/21 10.12  11/6/20 10.73.  11/5/19 9.49  7/30/19 10.88  4/30/19 9.06     Surgery  10/23/18 prostate biopsy negative           okay so okay that we will do a biopsy heart [facility] talk to Dr. Ojeda I will go right now and I put it in the Okay you need oxybutynin

## 2025-05-15 NOTE — PATIENT INSTRUCTIONS
Impression  BPH  Nocturia  Elevated PSA stable     Plan  PSA now and in a year  continue oxybutynin to 5 mg daily  Appointment in a year

## 2025-05-16 LAB — PSA SERPL-MCNC: 10.43 NG/ML

## 2025-11-18 ENCOUNTER — APPOINTMENT (OUTPATIENT)
Dept: PRIMARY CARE | Facility: CLINIC | Age: 65
End: 2025-11-18
Payer: COMMERCIAL

## 2026-05-04 ENCOUNTER — APPOINTMENT (OUTPATIENT)
Dept: UROLOGY | Facility: CLINIC | Age: 66
End: 2026-05-04
Payer: COMMERCIAL